# Patient Record
Sex: FEMALE | Race: WHITE | NOT HISPANIC OR LATINO | URBAN - NONMETROPOLITAN AREA
[De-identification: names, ages, dates, MRNs, and addresses within clinical notes are randomized per-mention and may not be internally consistent; named-entity substitution may affect disease eponyms.]

---

## 2019-04-23 ENCOUNTER — APPOINTMENT (RX ONLY)
Dept: URBAN - NONMETROPOLITAN AREA CLINIC 13 | Facility: CLINIC | Age: 64
Setting detail: DERMATOLOGY
End: 2019-04-23

## 2019-04-23 DIAGNOSIS — L30.9 DERMATITIS, UNSPECIFIED: ICD-10-CM

## 2019-04-23 PROBLEM — L29.8 OTHER PRURITUS: Status: ACTIVE | Noted: 2019-04-23

## 2019-04-23 PROBLEM — L85.3 XEROSIS CUTIS: Status: ACTIVE | Noted: 2019-04-23

## 2019-04-23 PROBLEM — L20.84 INTRINSIC (ALLERGIC) ECZEMA: Status: ACTIVE | Noted: 2019-04-23

## 2019-04-23 PROBLEM — F41.9 ANXIETY DISORDER, UNSPECIFIED: Status: ACTIVE | Noted: 2019-04-23

## 2019-04-23 PROCEDURE — 99201: CPT

## 2019-04-23 PROCEDURE — ? PRESCRIPTION

## 2019-04-23 PROCEDURE — ? COUNSELING

## 2019-04-23 RX ORDER — HYDROCORTISONE BUTYRATE 1 MG/G
1 CREAM TOPICAL BID
Qty: 1 | Refills: 0 | Status: ERX | COMMUNITY
Start: 2019-04-23

## 2019-04-23 RX ADMIN — HYDROCORTISONE BUTYRATE 1: 1 CREAM TOPICAL at 00:00

## 2019-04-23 ASSESSMENT — LOCATION DETAILED DESCRIPTION DERM
LOCATION DETAILED: LEFT MEDIAL SUPERIOR EYELID
LOCATION DETAILED: LEFT LATERAL SUPERIOR EYELID

## 2019-04-23 ASSESSMENT — LOCATION ZONE DERM: LOCATION ZONE: EYELID

## 2019-04-23 ASSESSMENT — LOCATION SIMPLE DESCRIPTION DERM: LOCATION SIMPLE: LEFT SUPERIOR EYELID

## 2019-04-23 NOTE — HPI: OTHER
Condition:: Eczema
Please Describe Your Condition:: comes in for a chief complaint of Eczema. Pt states that she had red scaly patches roughly a year ago on her arms, arm pits and legs. This gradually faded and no recurrent problems. She recently had a flare on her face of red scaly patches which come and go. there is mild associated itching. she uses various shampoos and hair conditioners. no facial make up